# Patient Record
Sex: FEMALE | Race: BLACK OR AFRICAN AMERICAN | Employment: FULL TIME | URBAN - METROPOLITAN AREA
[De-identification: names, ages, dates, MRNs, and addresses within clinical notes are randomized per-mention and may not be internally consistent; named-entity substitution may affect disease eponyms.]

---

## 2024-04-17 ENCOUNTER — APPOINTMENT (OUTPATIENT)
Dept: CT IMAGING | Age: 49
End: 2024-04-17
Attending: PHYSICIAN ASSISTANT
Payer: MEDICAID

## 2024-04-17 ENCOUNTER — HOSPITAL ENCOUNTER (OUTPATIENT)
Age: 49
Discharge: HOME | End: 2024-04-17
Payer: MEDICAID

## 2024-04-17 VITALS
OXYGEN SATURATION: 98 % | HEIGHT: 64 IN | DIASTOLIC BLOOD PRESSURE: 83 MMHG | HEART RATE: 77 BPM | BODY MASS INDEX: 32.78 KG/M2 | RESPIRATION RATE: 18 BRPM | TEMPERATURE: 98 F | WEIGHT: 192 LBS | SYSTOLIC BLOOD PRESSURE: 118 MMHG

## 2024-04-17 DIAGNOSIS — R51.9 ACUTE NONINTRACTABLE HEADACHE, UNSPECIFIED HEADACHE TYPE: Primary | ICD-10-CM

## 2024-04-17 PROCEDURE — 99213 OFFICE O/P EST LOW 20 MIN: CPT | Performed by: PHYSICIAN ASSISTANT

## 2024-04-17 PROCEDURE — 70450 CT HEAD/BRAIN W/O DYE: CPT | Performed by: PHYSICIAN ASSISTANT

## 2024-04-17 RX ORDER — METFORMIN HYDROCHLORIDE 500 MG/1
TABLET, EXTENDED RELEASE ORAL
COMMUNITY

## 2024-04-17 RX ORDER — LISINOPRIL 20 MG/1
1 TABLET ORAL DAILY
COMMUNITY
Start: 2023-07-13

## 2024-04-17 RX ORDER — AMLODIPINE BESYLATE 10 MG/1
TABLET ORAL
COMMUNITY

## 2024-04-17 NOTE — ED PROVIDER NOTES
Patient Seen in: [facility]      History     Chief Complaint   Patient presents with    Headache     Stated Complaint: pain in back of head, headache    Subjective:   HPI    50 YO female with PMHx of HTN, diabetes, presents to immediate care for evaluation of posterior headache characterized as a \"dull ache,\" rated 5 out of 10 starting 6 days ago.  Patient states headache has been intermittent and resolves with Advil. She states headache location is different from her usual temporal headaches that she associates with high blood pressure.  She states she has been monitoring her blood pressure daily and it has not been elevated.  She spoke with her PCP who recommended she come to IC for evaluation.  Patient is requesting head CT.  Denies any associated symptoms with headache.        Objective:   No pertinent past medical history.            No pertinent past surgical history.              No pertinent social history.            Review of Systems    Positive for stated complaint: pain in back of head, headache  Other systems are as noted in HPI.  Constitutional and vital signs reviewed.      All other systems reviewed and negative except as noted above.    Physical Exam     ED Triage Vitals [04/17/24 0856]   /83   Pulse 77   Resp 18   Temp 97.7 °F (36.5 °C)   Temp src Temporal   SpO2 98 %   O2 Device None (Room air)       Current:/83   Pulse 77   Temp 97.7 °F (36.5 °C) (Temporal)   Resp 18   Ht 162.6 cm (5' 4\")   Wt 87.1 kg   SpO2 98%   BMI 32.96 kg/m²         Physical Exam  Vitals and nursing note reviewed.   Constitutional:       General: She is not in acute distress.     Appearance: Normal appearance. She is not ill-appearing, toxic-appearing or diaphoretic.   Cardiovascular:      Rate and Rhythm: Normal rate and regular rhythm.   Pulmonary:      Effort: Pulmonary effort is normal. No respiratory distress.      Breath sounds: Normal breath sounds. No wheezing.   Musculoskeletal:       Cervical back: Normal range of motion. No tenderness.   Neurological:      General: No focal deficit present.      Mental Status: She is alert and oriented to person, place, and time.      GCS: GCS eye subscore is 4. GCS verbal subscore is 5. GCS motor subscore is 6.      Cranial Nerves: No cranial nerve deficit.      Coordination: Finger-Nose-Finger Test normal.   Psychiatric:         Mood and Affect: Mood normal.         Behavior: Behavior normal.         ED Course   Labs Reviewed - No data to display  CT BRAIN OR HEAD (09540)    Result Date: 4/17/2024  PROCEDURE:  CT BRAIN OR HEAD (29484)  COMPARISON:  None.  INDICATIONS:  pain in back of head, headache  TECHNIQUE:  Noncontrast CT scanning is performed through the brain. Dose reduction techniques were used. Dose information is transmitted to the ACR (American College of Radiology) NRDR (National Radiology Data Registry) which includes the Dose Index Registry.  PATIENT STATED HISTORY: (As transcribed by Technologist)  Patient states she has had a heache to back of head for the past week. Patient denies any injury, nausea, vomiting or visual changes.    FINDINGS:  VENTRICLES/SULCI:  Ventricles and sulci are normal in size.  INTRACRANIAL:  There are no abnormal extraaxial fluid collections.  There is no midline shift.  There are no intraparenchymal brain abnormalities.  There is nothing specific for acute infarct.  There is no hemorrhage or mass lesion.  SINUSES:           No sign of acute sinusitis.  MASTOIDS:          No sign of acute inflammation. SKULL:             No evidence for fracture or osseous abnormality. OTHER:             None.            CONCLUSION:  There is no acute abnormality on the noncontrast CT of the head.    LOCATION:  [facility]   Dictated by (CST): Jg Gutierrez MD on 4/17/2024 at 9:44 AM     Finalized by (CST): Jg Gutierrez MD on 4/17/2024 at 9:47 AM          MDM      This is a well-appearing 48 YO female with PMHx of HTN, diabetes, with  intermittent occipital headaches starting 6 days ago.     Differential diagnosis considered but not limited to occipital neuralgia, other benign headache, less likely acute intracranial process    Neurological exam is unremarkable. No focal deficits. GCS 15. Head CT negative for acute abnormality. No tenderness at the scalp or neck.   Patient was very relieved to learn of negative head CT results.  She feels comfortable going home at this time and will continue follow-up with her PCP.  ER precautions discussed with patient understanding.       Medical Decision Making  Amount and/or Complexity of Data Reviewed  Radiology: ordered. Decision-making details documented in ED Course.    Risk  OTC drugs.        Disposition and Plan     Clinical Impression:  1. Acute nonintractable headache, unspecified headache type         Disposition:  Discharge  4/17/2024  9:58 am    Follow-up:  [facility] [address] [id number] [phone]    If symptoms worsen          Medications Prescribed:  Discharge Medication List as of 4/17/2024  9:59 AM